# Patient Record
(demographics unavailable — no encounter records)

---

## 2025-04-28 NOTE — HISTORY OF PRESENT ILLNESS
[Patient reported breast sonogram was normal] : Patient reported breast sonogram was normal [N] : Patient does not use contraception [Y] : Positive pregnancy history [Normal Amount/Duration] :  normal amount and duration [Frequency: Q ___ days] : menstrual periods occur approximately every [unfilled] days [Currently Active] : currently active [Men] : men [No] : No [Patient refuses STI testing] : Patient refuses STI testing [BreastSonogramDate] : 2022 [TextBox_25] : as per patient [LMPDate] : 04/17/2025 [MensesFreq] : 28 [MensesLength] : 4 [PGHxTotal] : 2 [PGxPremature] : 1 [Page Hospitaliving] : 1 [TextBox_9] : 12 [FreeTextEntry1] : 04/17/2025